# Patient Record
Sex: FEMALE | Race: WHITE | URBAN - METROPOLITAN AREA
[De-identification: names, ages, dates, MRNs, and addresses within clinical notes are randomized per-mention and may not be internally consistent; named-entity substitution may affect disease eponyms.]

---

## 2020-02-21 ENCOUNTER — EMERGENCY (EMERGENCY)
Facility: HOSPITAL | Age: 50
LOS: 0 days | Discharge: HOME | End: 2020-02-21
Attending: EMERGENCY MEDICINE
Payer: MEDICAID

## 2020-02-21 VITALS
SYSTOLIC BLOOD PRESSURE: 145 MMHG | OXYGEN SATURATION: 98 % | RESPIRATION RATE: 16 BRPM | DIASTOLIC BLOOD PRESSURE: 83 MMHG | HEART RATE: 72 BPM | TEMPERATURE: 99 F

## 2020-02-21 VITALS — HEIGHT: 65 IN | WEIGHT: 162.92 LBS

## 2020-02-21 DIAGNOSIS — R10.31 RIGHT LOWER QUADRANT PAIN: ICD-10-CM

## 2020-02-21 DIAGNOSIS — R11.0 NAUSEA: ICD-10-CM

## 2020-02-21 LAB
APPEARANCE UR: CLEAR — SIGNIFICANT CHANGE UP
BASOPHILS # BLD AUTO: 0.02 K/UL — SIGNIFICANT CHANGE UP (ref 0–0.2)
BASOPHILS NFR BLD AUTO: 0.3 % — SIGNIFICANT CHANGE UP (ref 0–2)
BILIRUB UR-MCNC: NEGATIVE — SIGNIFICANT CHANGE UP
COLOR SPEC: YELLOW — SIGNIFICANT CHANGE UP
DIFF PNL FLD: ABNORMAL
EOSINOPHIL # BLD AUTO: 0.06 K/UL — SIGNIFICANT CHANGE UP (ref 0–0.5)
EOSINOPHIL NFR BLD AUTO: 0.8 % — SIGNIFICANT CHANGE UP (ref 0–6)
GLUCOSE UR QL: NEGATIVE MG/DL — SIGNIFICANT CHANGE UP
HCT VFR BLD CALC: 40.1 % — SIGNIFICANT CHANGE UP (ref 34.5–45)
HGB BLD-MCNC: 13.1 G/DL — SIGNIFICANT CHANGE UP (ref 11.5–15.5)
IMM GRANULOCYTES NFR BLD AUTO: 0.1 % — SIGNIFICANT CHANGE UP (ref 0–1.5)
KETONES UR-MCNC: NEGATIVE — SIGNIFICANT CHANGE UP
LEUKOCYTE ESTERASE UR-ACNC: NEGATIVE — SIGNIFICANT CHANGE UP
LYMPHOCYTES # BLD AUTO: 2.15 K/UL — SIGNIFICANT CHANGE UP (ref 1–3.3)
LYMPHOCYTES # BLD AUTO: 29.7 % — SIGNIFICANT CHANGE UP (ref 13–44)
MCHC RBC-ENTMCNC: 28.8 PG — SIGNIFICANT CHANGE UP (ref 27–34)
MCHC RBC-ENTMCNC: 32.7 GM/DL — SIGNIFICANT CHANGE UP (ref 32–36)
MCV RBC AUTO: 88.1 FL — SIGNIFICANT CHANGE UP (ref 80–100)
MONOCYTES # BLD AUTO: 0.41 K/UL — SIGNIFICANT CHANGE UP (ref 0–0.9)
MONOCYTES NFR BLD AUTO: 5.7 % — SIGNIFICANT CHANGE UP (ref 2–14)
NEUTROPHILS # BLD AUTO: 4.6 K/UL — SIGNIFICANT CHANGE UP (ref 1.8–7.4)
NEUTROPHILS NFR BLD AUTO: 63.4 % — SIGNIFICANT CHANGE UP (ref 43–77)
NITRITE UR-MCNC: NEGATIVE — SIGNIFICANT CHANGE UP
PH UR: 5 — SIGNIFICANT CHANGE UP (ref 5–8)
PLATELET # BLD AUTO: 307 K/UL — SIGNIFICANT CHANGE UP (ref 150–400)
PROT UR-MCNC: NEGATIVE MG/DL — SIGNIFICANT CHANGE UP
RBC # BLD: 4.55 M/UL — SIGNIFICANT CHANGE UP (ref 3.8–5.2)
RBC # FLD: 14.1 % — SIGNIFICANT CHANGE UP (ref 10.3–14.5)
SP GR SPEC: 1.01 — SIGNIFICANT CHANGE UP (ref 1.01–1.02)
UROBILINOGEN FLD QL: NEGATIVE MG/DL — SIGNIFICANT CHANGE UP
WBC # BLD: 7.25 K/UL — SIGNIFICANT CHANGE UP (ref 3.8–10.5)
WBC # FLD AUTO: 7.25 K/UL — SIGNIFICANT CHANGE UP (ref 3.8–10.5)

## 2020-02-21 PROCEDURE — 74177 CT ABD & PELVIS W/CONTRAST: CPT

## 2020-02-21 PROCEDURE — 85025 COMPLETE CBC W/AUTO DIFF WBC: CPT

## 2020-02-21 PROCEDURE — 96374 THER/PROPH/DIAG INJ IV PUSH: CPT | Mod: 59

## 2020-02-21 PROCEDURE — 74177 CT ABD & PELVIS W/CONTRAST: CPT | Mod: 26

## 2020-02-21 PROCEDURE — 36415 COLL VENOUS BLD VENIPUNCTURE: CPT

## 2020-02-21 PROCEDURE — 80053 COMPREHEN METABOLIC PANEL: CPT

## 2020-02-21 PROCEDURE — 99284 EMERGENCY DEPT VISIT MOD MDM: CPT

## 2020-02-21 PROCEDURE — 83690 ASSAY OF LIPASE: CPT

## 2020-02-21 PROCEDURE — 81001 URINALYSIS AUTO W/SCOPE: CPT

## 2020-02-21 PROCEDURE — 99284 EMERGENCY DEPT VISIT MOD MDM: CPT | Mod: 25

## 2020-02-21 RX ORDER — ONDANSETRON 8 MG/1
4 TABLET, FILM COATED ORAL ONCE
Refills: 0 | Status: COMPLETED | OUTPATIENT
Start: 2020-02-21 | End: 2020-02-21

## 2020-02-21 RX ORDER — ACETAMINOPHEN 500 MG
975 TABLET ORAL ONCE
Refills: 0 | Status: COMPLETED | OUTPATIENT
Start: 2020-02-21 | End: 2020-02-21

## 2020-02-21 RX ADMIN — Medication 975 MILLIGRAM(S): at 17:29

## 2020-02-21 RX ADMIN — ONDANSETRON 4 MILLIGRAM(S): 8 TABLET, FILM COATED ORAL at 17:30

## 2020-02-21 NOTE — ED STATDOCS - OBJECTIVE STATEMENT
50 y/o female with PMHx of presents to the ED c/o intermittent worsening RLQ +abd pain x2 weeks. Endorses associated +nausea but no vomiting. No fever, dysuria, or urinary frequency. Took ibuprofen with minimal relief. LMP: 8 months ago. Former smoker. NKDA.

## 2020-02-21 NOTE — ED STATDOCS - CARE PROVIDER_API CALL
Khris Aldana)  Gastroenterology; Internal Medicine  [address] [place] [address]  Phone: [phone]  Fax: [phone]  Follow Up Time: 1-3 Days

## 2020-02-21 NOTE — ED STATDOCS - NS ED ROS FT
Constitutional: No fever or chills  Eyes: No visual changes  HEENT: No throat pain  CV: No chest pain  Resp: No SOB no cough  GI: No vomiting, +abd pain, +nausea   : No dysuria  MSK: No musculoskeletal pain  Skin: No rash  Neuro: No headache

## 2020-02-21 NOTE — ED STATDOCS - CLINICAL SUMMARY MEDICAL DECISION MAKING FREE TEXT BOX
48 y/o Female presents to the ED for intermittent worsening abd now localized to RLQ with nausea but no vomiting. Exam with TTP to RLQ. Will obtain CT to r/o appendicitis / diverticulitis, labs, and reassess. 48 y/o Female presents to the ED for intermittent worsening abd now localized to RLQ with nausea but no vomiting. Exam with TTP to RLQ. Will obtain CT to r/o appendicitis / diverticulitis, labs, and reassess.    PA note: 49 year old female seen and evaluated for abd pain x 2 weeks. CT abd/pel NEG. b/w NEG. Patient re-examined and re-evaluated. Patient feels much better at this time. ED evaluation, Diagnosis and management discussed with the patient in detail. Workup results discussed with ED attending FIDELIA Bella to [place] home. GI f/u, [facility] follow up encouraged.  Strict ED return instructions discussed in detail and patient given the opportunity to ask any questions about their discharge diagnosis and instructions. Patient verbalized understanding. ~ TONY Edwards 48 y/o Female presents to the ED for intermittent worsening abd now localized to right mid abd with nausea but no vomiting. Exam with TTP to RLQ. Will obtain CT to r/o appendicitis / diverticulitis, low suspicion torsion as pain is higher in mid abd. labs, and reassess.    PA note: 49 year old female seen and evaluated for abd pain x 2 weeks. CT abd/pel NEG. b/w NEG. Patient re-examined and re-evaluated. Patient feels much better at this time. ED evaluation, Diagnosis and management discussed with the patient in detail. Workup results discussed with ED attending FIDELIA Bella to [place]. GI f/u, [facility] follow up encouraged.  Strict ED return instructions discussed in detail and patient given the opportunity to ask any questions about their discharge diagnosis and instructions. Patient verbalized understanding. ~ TONY Edwards

## 2020-02-21 NOTE — ED STATDOCS - NSFOLLOWUPINSTRUCTIONS_ED_ALL_ED_FT
Abdominal Pain, Adult  Abdominal pain can be caused by many things. Often, abdominal pain is not serious and it gets better with no treatment or by being treated at home. However, sometimes abdominal pain is serious. Your health care provider will do a medical history and a physical exam to try to determine the cause of your abdominal pain.  Follow these instructions at home:  Take over-the-counter and prescription medicines only as told by your health care provider. Do not take a laxative unless told by your health care provider.Drink enough fluid to keep your urine clear or pale yellow.Watch your condition for any changes.Keep all follow-up visits as told by your health care provider. This is important.Contact a health care provider if:  Your abdominal pain changes or gets worse.You are not hungry or you lose weight without trying.You are constipated or have diarrhea for more than 2–3 days.You have pain when you urinate or have a bowel movement.Your abdominal pain wakes you up at night.Your pain gets worse with meals, after eating, or with certain foods.You are throwing up and cannot keep anything down.You have a fever.Get help right away if:  Your pain does not go away as soon as your health care provider told you to expect.You cannot stop throwing up.Your pain is only in areas of the abdomen, such as the right side or the left lower portion of the abdomen.You have bloody or black stools, or stools that look like tar.You have severe pain, cramping, or bloating in your abdomen.You have signs of dehydration, such as:  Dark urine, very little urine, or no urine.Cracked lips.Dry mouth.Sunken eyes.Sleepiness.Weakness.This information is not intended to replace advice given to you by your health care provider. Make sure you discuss any questions you have with your health care provider.

## 2020-02-21 NOTE — ED STATDOCS - PATIENT PORTAL LINK FT
You can access the FollowMyHealth Patient Portal offered by [facility] by registering at the following website: [email or web address]. By joining [employer]’s FollowMyHealth portal, you will also be able to view your health information using other applications (apps) compatible with our system.

## 2020-02-21 NOTE — ED STATDOCS - PROGRESS NOTE DETAILS
Geovani ELIZONDO for ED attending, Dr. Arroyo: Offered  services, pt declines at this time. PA note: Patient is a 49 year old female y/o female with no significant PMHx who presents to ED c/o vague right lower abd pain x 2 weeks. +Mild nausea. DENIES vomiting, fever, chills, diarrhea, dysuria, or urinary frequency. Took ibuprofen with minimal relief. LMP: 8 months ago. Former smoker. NKDA. ~TONY Edwards PA note: CT abd/pel NEG. b/w NEG. Patient re-examined and re-evaluated. Patient feels much better at this time. ED evaluation, Diagnosis and management discussed with the patient in detail. Workup results discussed with ED attending FIDELIA Bella to [place] home. GI f/u, [facility] follow up encouraged.  Strict ED return instructions discussed in detail and patient given the opportunity to ask any questions about their discharge diagnosis and instructions. Patient verbalized understanding. ~ TONY Edwards

## 2020-02-21 NOTE — ED STATDOCS - GASTROINTESTINAL, MLM
abdomen soft, +Very mild tenderness to palpation right mid-abd. NEG Clement. NEG McBurney's point tenderness. NEG Rovsing. NEG Psoas. No CVAT

## 2020-02-21 NOTE — ED STATDOCS - NSFOLLOWUPCLINICS_GEN_ALL_ED_FT
[facility]  Family Medicine  [address] [place] [address]  Phone: [phone]  Fax:   Follow Up Time:

## 2020-02-21 NOTE — ED STATDOCS - NS_ ATTENDINGSCRIBEDETAILS _ED_A_ED_FT
I, Andrzej Arroyo MD,  performed the initial face to face bedside interview with this patient regarding history of present illness, review of symptoms and relevant past medical, social and family history.  I completed an independent physical examination.  I was the initial provider who evaluated this patient.  The history, relevant review of systems, past medical and surgical history, medical decision making, and physical examination was documented by the scribe in my presence and I attest to the accuracy of the documentation.

## 2020-02-21 NOTE — ED STATDOCS - PHYSICAL EXAMINATION
Constitutional: NAD AAOx3  Eyes: PERRLA EOMI  Head: Normocephalic atraumatic  Mouth: MMM  Cardiac: regular rate   Resp: Lungs CTAB  GI: +TTP RLQ. No CVA tenderness.   Neuro: CN2-12 intact  Skin: No rashes Constitutional: NAD AAOx3  Eyes: PERRLA EOMI  Head: Normocephalic atraumatic  Mouth: MMM  Cardiac: regular rate   Resp: Lungs CTAB  GI: +TTP RLQ. No CVA tenderness. no rebound or guarding negative erynoso.   Neuro: CN2-12 intact  Skin: No rashes

## 2020-12-13 ENCOUNTER — EMERGENCY (EMERGENCY)
Facility: HOSPITAL | Age: 50
LOS: 0 days | Discharge: HOME | End: 2020-12-13
Attending: STUDENT IN AN ORGANIZED HEALTH CARE EDUCATION/TRAINING PROGRAM
Payer: MEDICAID

## 2020-12-13 VITALS
DIASTOLIC BLOOD PRESSURE: 82 MMHG | OXYGEN SATURATION: 97 % | SYSTOLIC BLOOD PRESSURE: 123 MMHG | RESPIRATION RATE: 17 BRPM | TEMPERATURE: 99 F | HEART RATE: 75 BPM

## 2020-12-13 VITALS — WEIGHT: 167.99 LBS | HEIGHT: 65 IN

## 2020-12-13 DIAGNOSIS — M79.89 OTHER SPECIFIED SOFT TISSUE DISORDERS: ICD-10-CM

## 2020-12-13 DIAGNOSIS — M65.221 CALCIFIC TENDINITIS, RIGHT UPPER ARM: ICD-10-CM

## 2020-12-13 DIAGNOSIS — M25.521 PAIN IN RIGHT ELBOW: ICD-10-CM

## 2020-12-13 DIAGNOSIS — M25.531 PAIN IN RIGHT WRIST: ICD-10-CM

## 2020-12-13 PROBLEM — Z78.9 OTHER SPECIFIED HEALTH STATUS: Chronic | Status: ACTIVE | Noted: 2020-02-21

## 2020-12-13 PROCEDURE — 73080 X-RAY EXAM OF ELBOW: CPT | Mod: 26,RT

## 2020-12-13 PROCEDURE — 99283 EMERGENCY DEPT VISIT LOW MDM: CPT

## 2020-12-13 PROCEDURE — 99283 EMERGENCY DEPT VISIT LOW MDM: CPT | Mod: 25

## 2020-12-13 PROCEDURE — 73080 X-RAY EXAM OF ELBOW: CPT | Mod: RT

## 2020-12-13 RX ORDER — IBUPROFEN 200 MG
1 TABLET ORAL
Qty: 30 | Refills: 0
Start: 2020-12-13

## 2020-12-13 NOTE — ED STATDOCS - PATIENT PORTAL LINK FT
You can access the FollowMyHealth Patient Portal offered by [facility] by registering at the following website: [email or web address]. By joining [employer]’s FollowMyHealth portal, you will also be able to view your health information using other applications (apps) compatible with our system.

## 2020-12-13 NOTE — ED STATDOCS - MUSCULOSKELETAL, MLM
range of motion is not limited and there is no muscle tenderness. mild tenderness to the mid posterior elbow mild tenderness to the right forearm. No deformity

## 2020-12-13 NOTE — ED STATDOCS - CLINICAL SUMMARY MEDICAL DECISION MAKING FREE TEXT BOX
pt presents to the ED c/o R forearm pain, will obtain imagining, reeval. pt presents to the ED c/o R forearm pain, will obtain imaging, reeval.

## 2020-12-13 NOTE — ED STATDOCS - NSFOLLOWUPINSTRUCTIONS_ED_ALL_ED_FT
TAKE MOTRIN AS PRESCRIBED. FOLLOW UP WITH ORTHOPEDICS AS ADVISED.    Calcific Tendinitis    WHAT YOU NEED TO KNOW:    What is calcific tendinitis? Calcific tendinitis is a condition that occurs when calcium collects in the tendons of the shoulder. Tendons are bands of tissue that connect muscle to bone. The calcium can make the tendons swell and cause severe pain.    What increases my risk for calcific tendinitis? There is no known cause of calcific tendinitis. The following may increase your risk:   •Family history of calcific tendinitis      •Age between 30 and 50 years      •Female gender      •Lack of physical activity      •Medical conditions, such as diabetes      What are the signs and symptoms of calcific tendinitis? Signs and symptoms may be sudden and severe, lasting several weeks. Symptoms can also be mild and last several months.   •Pain in your shoulder that may spread to your neck      •Trouble sleeping because of pain       •Stiffness or weakness in your arm or shoulder      •Decreased arm movement      How is calcific tendinitis diagnosed? Your healthcare provider will check how well you can move your shoulder and arm. He may check the function of your elbow, wrist, and hand. He may compare the movement and strength of your painful shoulder against your healthy shoulder. You may also need the following tests:   •An x-ray may show calcium buildup in your shoulder.      •An ultrasound uses sound waves to show pictures on a monitor. An ultrasound may be done to show the cause of your pain.      •An MRI takes pictures of your shoulder. An MRI may show calcium in your shoulder or another cause of your pain. You may be given dye to help the parts of your shoulder show up better in the pictures. Tell the healthcare provider if you have ever had an allergic reaction to contrast dye. Do not enter the MRI room with anything metal. Metal can cause serious injury. Tell the healthcare provider if you have any metal in or on your body.      How is calcific tendinitis treated? Calcific tendinitis may go away on its own. The body absorbs the calcium, and the tendon heals. You may need any of the following:   •Medicines: ?NSAIDs may decrease swelling and pain. This medicine can be bought with or without a doctor's order. This medicine can cause stomach bleeding or kidney problems in certain people. If you take blood thinner medicine, always ask your healthcare provider if NSAIDs are safe for you. Always read the medicine label and follow the directions on it before using this medicine.      ?Steroids help decrease inflammation. You may be given steroids as a pill or a shot in your shoulder.      •Needling is a procedure used to break up the calcium or remove it. Your healthcare provider inserts one or more needles through your skin and into your shoulder.       •Extracorporeal shockwave therapy (ESWT) uses sound waves aimed at the calcium to help break it up. The pieces of calcium are then absorbed back into the body. ESWT may be done if symptoms last 3 months or longer.      •Surgery may be needed to remove the calcium if you have severe pain for several months and other treatments have not helped. Damaged tissue or bone may also be removed.      How can I manage my symptoms?   •Go to physical therapy. A physical therapist can teach you exercises to help improve movement and strength, and to decrease pain.      •Apply ice on your shoulder for 15 to 20 minutes every hour or as directed. Use an ice pack, or put crushed ice in a plastic bag. Cover it with a towel. Ice helps prevent tissue damage and decreases swelling and pain.      •Apply heat on your shoulder for 20 to 30 minutes every 2 hours for as many days as directed. Heat helps decrease pain and muscle spasms.      •Rest your arm. Healthcare providers may have you place an item, such as a ball, between your side and elbow while you rest. This may help decrease stiffness and pain.      When should I contact my healthcare provider?   •You have worse pain and stiffness in your shoulder.      •You have new or more trouble moving your arm.      •You have questions or concerns about your condition or care.      When should I seek immediate care or call 911?   •You cannot move your arm.      •You have severe pain in your arm or shoulder.      CARE AGREEMENT:    You have the right to help plan your care. Learn about your health condition and how it may be treated. Discuss treatment options with your healthcare providers to decide what care you want to receive. You always have the right to refuse treatment.        © Copyright [employer] 2020

## 2020-12-13 NOTE — ED ADULT NURSE NOTE - NSIMPLEMENTINTERV_GEN_ALL_ED
Implemented All Universal Safety Interventions:  [place] to call system. Call bell, personal items and telephone within reach. Instruct patient to call for assistance. Room bathroom lighting operational. Non-slip footwear when patient is off stretcher. Physically safe environment: no spills, clutter or unnecessary equipment. Stretcher in lowest position, wheels locked, appropriate side rails in place.

## 2020-12-13 NOTE — ED STATDOCS - ATTENDING CONTRIBUTION TO CARE
I, Breanna Mojica DO,  performed the initial face to face bedside interview with this patient regarding history of present illness, review of symptoms and relevant past medical, social and family history.  I completed an independent physical examination.  I was the initial provider who evaluated this patient. I have signed out the follow up of any pending tests (i.e. labs, radiological studies) to the ACP.  I have communicated the patient’s plan of care and disposition with the ACP.  The history, relevant review of systems, past medical and surgical history, medical decision making, and physical examination was documented by the scribe in my presence and I attest to the accuracy of the documentation.

## 2020-12-13 NOTE — ED STATDOCS - PROGRESS NOTE DETAILS
51 y/o F with no PMH presents with right elbow pain/swelling x 2 weeks. Denies acute trauma. Pt states she is a , doing same motions with her arms at work. Denies fever, chills, neck/back pain. Took motrin for pain PTA today. PE: Well appearing. MSK: No obvious deformity to upper extremities. +TTP right elbow and forearm. sensation intact to light touch in all extremities. 5/5 strength in all extremities. 2+ radial and ulnar pulses. Full ROM in right shoulder, elbow, wrist, digits. A/P: Likely repetitive motion injury. Plan for XR and reassess. - Dieudonne Ibarra PA-C

## 2020-12-13 NOTE — ED STATDOCS - OBJECTIVE STATEMENT
49 y/o female with no significant pmhx presents to the ED c/o R elbow, wrist pain associated with swelling x2 weeks. Pt states that pain is radiating to the R elbow onset today. Pt is right hand dominant. Denies pain in the back or neck. Denies recent trauma or injury. No other complaints at this time.

## 2020-12-13 NOTE — ED ADULT TRIAGE NOTE - CHIEF COMPLAINT QUOTE
c/o right elbow pain and swelling for past 4 days, pain radiating to right wrist, denies acute injury

## 2021-02-09 ENCOUNTER — OUTPATIENT (OUTPATIENT)
Dept: OUTPATIENT SERVICES | Facility: HOSPITAL | Age: 51
LOS: 1 days | End: 2021-02-09
Payer: COMMERCIAL

## 2021-02-09 ENCOUNTER — APPOINTMENT (OUTPATIENT)
Dept: MAMMOGRAPHY | Facility: CLINIC | Age: 51
End: 2021-02-09
Payer: COMMERCIAL

## 2021-02-09 DIAGNOSIS — Z76.0 ENCOUNTER FOR ISSUE OF REPEAT PRESCRIPTION: ICD-10-CM

## 2021-02-09 PROCEDURE — 77067 SCR MAMMO BI INCL CAD: CPT

## 2021-02-09 PROCEDURE — 77063 BREAST TOMOSYNTHESIS BI: CPT | Mod: 26

## 2021-02-09 PROCEDURE — 77067 SCR MAMMO BI INCL CAD: CPT | Mod: 26

## 2021-02-09 PROCEDURE — 77063 BREAST TOMOSYNTHESIS BI: CPT

## 2021-06-16 ENCOUNTER — APPOINTMENT (OUTPATIENT)
Dept: ORTHOPEDIC SURGERY | Facility: HOSPITAL | Age: 51
End: 2021-06-16

## 2021-06-16 PROBLEM — Z00.00 ENCOUNTER FOR PREVENTIVE HEALTH EXAMINATION: Status: ACTIVE | Noted: 2021-06-16

## 2021-11-17 ENCOUNTER — OUTPATIENT (OUTPATIENT)
Dept: OUTPATIENT SERVICES | Facility: HOSPITAL | Age: 51
LOS: 1 days | End: 2021-11-17
Payer: SELF-PAY

## 2021-11-17 ENCOUNTER — APPOINTMENT (OUTPATIENT)
Dept: ORTHOPEDIC SURGERY | Facility: HOSPITAL | Age: 51
End: 2021-11-17

## 2021-11-17 ENCOUNTER — RESULT REVIEW (OUTPATIENT)
Age: 51
End: 2021-11-17

## 2021-11-17 VITALS
TEMPERATURE: 96.1 F | DIASTOLIC BLOOD PRESSURE: 87 MMHG | BODY MASS INDEX: 28.66 KG/M2 | WEIGHT: 172 LBS | HEART RATE: 83 BPM | HEIGHT: 65 IN | SYSTOLIC BLOOD PRESSURE: 139 MMHG

## 2021-11-17 DIAGNOSIS — M79.609 PAIN IN UNSPECIFIED LIMB: ICD-10-CM

## 2021-11-17 DIAGNOSIS — M67.40 GANGLION, UNSPECIFIED SITE: ICD-10-CM

## 2021-11-17 DIAGNOSIS — G56.01 CARPAL TUNNEL SYNDROME, RIGHT UPPER LIMB: ICD-10-CM

## 2021-11-17 PROCEDURE — G0463: CPT

## 2021-11-17 PROCEDURE — 73110 X-RAY EXAM OF WRIST: CPT | Mod: 26,RT

## 2021-11-17 PROCEDURE — 73110 X-RAY EXAM OF WRIST: CPT

## 2021-11-17 RX ORDER — MELOXICAM 7.5 MG/1
7.5 TABLET ORAL DAILY
Qty: 14 | Refills: 1 | Status: ACTIVE | COMMUNITY
Start: 2021-11-17 | End: 1900-01-01

## 2022-02-16 ENCOUNTER — OUTPATIENT (OUTPATIENT)
Dept: OUTPATIENT SERVICES | Facility: HOSPITAL | Age: 52
LOS: 1 days | End: 2022-02-16
Payer: SELF-PAY

## 2022-02-16 ENCOUNTER — APPOINTMENT (OUTPATIENT)
Dept: ORTHOPEDIC SURGERY | Facility: HOSPITAL | Age: 52
End: 2022-02-16

## 2022-02-16 VITALS
HEART RATE: 82 BPM | TEMPERATURE: 98 F | WEIGHT: 154.32 LBS | BODY MASS INDEX: 25.71 KG/M2 | SYSTOLIC BLOOD PRESSURE: 138 MMHG | RESPIRATION RATE: 14 BRPM | DIASTOLIC BLOOD PRESSURE: 86 MMHG | HEIGHT: 65 IN

## 2022-02-16 DIAGNOSIS — M67.40 GANGLION, UNSPECIFIED SITE: ICD-10-CM

## 2022-02-16 DIAGNOSIS — G56.01 CARPAL TUNNEL SYNDROME, RIGHT UPPER LIMB: ICD-10-CM

## 2022-02-16 DIAGNOSIS — M79.609 PAIN IN UNSPECIFIED LIMB: ICD-10-CM

## 2022-02-16 PROCEDURE — G0463: CPT

## 2022-02-22 ENCOUNTER — APPOINTMENT (OUTPATIENT)
Dept: NEUROLOGY | Facility: CLINIC | Age: 52
End: 2022-02-22
Payer: COMMERCIAL

## 2022-02-22 PROCEDURE — 95885 MUSC TST DONE W/NERV TST LIM: CPT

## 2022-02-22 PROCEDURE — 95910 NRV CNDJ TEST 7-8 STUDIES: CPT

## 2022-03-16 ENCOUNTER — APPOINTMENT (OUTPATIENT)
Dept: ORTHOPEDIC SURGERY | Facility: HOSPITAL | Age: 52
End: 2022-03-16

## 2022-03-16 ENCOUNTER — OUTPATIENT (OUTPATIENT)
Dept: OUTPATIENT SERVICES | Facility: HOSPITAL | Age: 52
LOS: 1 days | End: 2022-03-16
Payer: SELF-PAY

## 2022-03-16 VITALS
HEART RATE: 83 BPM | WEIGHT: 170 LBS | DIASTOLIC BLOOD PRESSURE: 78 MMHG | HEIGHT: 61.5 IN | SYSTOLIC BLOOD PRESSURE: 108 MMHG | TEMPERATURE: 96.1 F | BODY MASS INDEX: 31.69 KG/M2

## 2022-03-16 DIAGNOSIS — M67.40 GANGLION, UNSPECIFIED SITE: ICD-10-CM

## 2022-03-16 DIAGNOSIS — G56.01 CARPAL TUNNEL SYNDROME, RIGHT UPPER LIMB: ICD-10-CM

## 2022-03-16 DIAGNOSIS — M25.50 PAIN IN UNSPECIFIED JOINT: ICD-10-CM

## 2022-03-16 PROCEDURE — G0463: CPT

## 2022-06-08 ENCOUNTER — EMERGENCY (EMERGENCY)
Facility: HOSPITAL | Age: 52
LOS: 0 days | Discharge: HOME | End: 2022-06-08
Attending: EMERGENCY MEDICINE
Payer: MEDICAID

## 2022-06-08 VITALS
RESPIRATION RATE: 17 BRPM | HEART RATE: 77 BPM | OXYGEN SATURATION: 96 % | TEMPERATURE: 98 F | SYSTOLIC BLOOD PRESSURE: 144 MMHG | DIASTOLIC BLOOD PRESSURE: 96 MMHG

## 2022-06-08 VITALS — HEIGHT: 65 IN | WEIGHT: 160.06 LBS
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
## 2022-06-08 DIAGNOSIS — Z23 ENCOUNTER FOR IMMUNIZATION: ICD-10-CM

## 2022-06-08 DIAGNOSIS — S61.411A LACERATION WITHOUT FOREIGN BODY OF RIGHT HAND, INITIAL ENCOUNTER: ICD-10-CM

## 2022-06-08 DIAGNOSIS — S61.011A LACERATION WITHOUT FOREIGN BODY OF RIGHT THUMB WITHOUT DAMAGE TO NAIL, INITIAL ENCOUNTER: ICD-10-CM
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
## 2022-06-08 PROCEDURE — 12001 RPR S/N/AX/GEN/TRNK 2.5CM/<: CPT

## 2022-06-08 PROCEDURE — 90471 IMMUNIZATION ADMIN: CPT

## 2022-06-08 PROCEDURE — 73140 X-RAY EXAM OF FINGER(S): CPT | Mod: 26,RT

## 2022-06-08 PROCEDURE — 99283 EMERGENCY DEPT VISIT LOW MDM: CPT | Mod: 25

## 2022-06-08 PROCEDURE — 73140 X-RAY EXAM OF FINGER(S): CPT | Mod: RT

## 2022-06-08 RX ORDER — TETANUS TOXOID, REDUCED DIPHTHERIA TOXOID AND ACELLULAR PERTUSSIS VACCINE, ADSORBED 5; 2.5; 8; 8; 2.5 [IU]/.5ML; [IU]/.5ML; UG/.5ML; UG/.5ML; UG/.5ML
0.5 SUSPENSION INTRAMUSCULAR ONCE
Refills: 0 | Status: DISCONTINUED | OUTPATIENT
Start: 2022-06-08 | End: 2022-06-08

## 2022-06-08 NOTE — ED STATDOCS - ATTENDING APP SHARED VISIT CONTRIBUTION OF CARE
I,Cameron Cerna MD,  performed the initial face to face bedside interview with this patient regarding history of present illness, review of symptoms and relevant past medical, social and family history.  I completed an independent physical examination.  I was the initial provider who evaluated this patient. I have signed out the follow up of any pending tests (i.e. labs, radiological studies) to the ACP.  I have communicated the patient’s plan of care and disposition with the ACP.  The history, relevant review of systems, past medical and surgical history, medical decision making, and physical examination was documented by the scribe in my presence and I attest to the accuracy of the documentation.

## 2022-06-08 NOTE — ED STATDOCS - OBJECTIVE STATEMENT
5y8m old male w/no pertinent PMH presents to the ED for lacerations to right hand after a plate broke in her hands while washing the dishes. Denies any falls or trauma. Pt with no other complaints. Unsure last tetanus. 51 year old female w/no pertinent PMH presents to the ED for lacerations to right hand after a plate broke in her hands while washing the dishes. Denies any falls or trauma. Pt with no other complaints. Unsure last tetanus.

## 2022-06-08 NOTE — ED STATDOCS - PROGRESS NOTE DETAILS
signed Justina Espino PA-C Pt seen initially in intake by Dr Cerna.   Pt with lac to base of palm of right thumb sustained while washing dished at work PTA. Gross motor/sensation intact. No significant findings on xray. SImple lac repair. Tetanus up to date, less than 10 years as per pt.

## 2022-06-08 NOTE — ED STATDOCS - PATIENT PORTAL LINK FT
You can access the FollowMyHealth Patient Portal offered by [facility] by registering at the following website: [email or web address]. By joining [employer]’s FollowMyHealth portal, you will also be able to view your health information using other applications (apps) compatible with our system.

## 2022-06-08 NOTE — ED ADULT TRIAGE NOTE - CHIEF COMPLAINT QUOTE
PT C/O LEFT HAND INJURY, LAC TO THE LEFT HAND, 3 INCH LAC ON BACK OF THUMB. PT C/O LEFT HAND INJURY, 3 INCH LAC NOTED ON BASE OF THUMB, PT STATES SHE WAS WASHING THE DISHES AND IT SNAPPED IN HALF AND INJURED HER HAND.

## 2022-06-08 NOTE — ED STATDOCS - NSFOLLOWUPINSTRUCTIONS_ED_ALL_ED_FT
SIGUE A TU MÉDICO EN 7-10 DÍAS. REGRESE A LA ER PARA CUALQUIER SÍNTOMA PENDIENTE O NUEVAS PREOCUPACIONES.     Cuidado de un desgarro, en adultos  Laceration Care, Adult  Un desgarro es un megha que puede atravesar todas las capas de la piel hasta el tejido que se encuentra debajo de la piel. Algunos desgarros cicatrizan por sí solos. Otros se deben cerrar con puntos (suturas), grapas, tiras adhesivas para la piel o goma para cerrar la piel. El cuidado adecuado de un desgarro reduce el riesgo de infección, ayuda a que el desgarro cierre mejor, y puede prevenir la formación de cicatrices.  Cómo cuidar del desgarro  Lávese las andrey con agua y jabón antes de tocarse la herida y cambiar la venda (vendaje). Use desinfectante para andrey si no dispone de agua y jabón.  Mantenga la herida limpia y seca.  Si le colocaron un vendaje, cámbielo al menos jesus vez al día o fiorella se lo haya indicado el médico. También debe cambiarlo si se moja o se ensucia.  Si se utilizaron suturas o grapas:     Mantenga la herida completamente seca chelsey las primeras 24 horas o fiorella se lo haya indicado el médico. Transcurrido lele tiempo, puede ducharse o fortunato bryan de inmersión. No obstante, asegúrese de no sumergir la herida en agua hasta que le hayan quitado las suturas o las grapas.Limpie la herida jesus vez por día o fiorella se lo haya indicado el médico:  Lave la herida con agua y jabón.Enjuáguela con agua para quitar todo el jabón.Séquela dando palmaditas con jesus toalla limpia. No frote la herida.Después de limpiar la herida, aplique jesus delgada capa de ungüento con antibiótico fiorella se lo haya indicado el médico. [facility] ayudará a prevenir infecciones y a evitar que el vendaje se adhiera a la herida.Jude que le retiren las suturas o las grapas fiorella se lo haya indicado el médico.Si se utilizaron tiras adhesivas para la piel:     No deje que las tiras adhesivas para la piel se mojen. Puede bañarse o ducharse, samira tenga cuidado de no mojar la herida.Si se moja, séquela dando palmaditas con jesus toalla limpia. No frote la herida.Las tiras adhesivas para la piel se caen solas. Puede recortar las tiras a medida que la herida se simeon. No quite las tiras adhesivas para la piel que aún estén pegadas a la herida. Estas se caerán cuando sea el momento.Si se utilizó goma para cerrar la piel:     Trate de mantener la herida seca; sin embargo, puede mojarla ligeramente cuando se bañe o se duche. No sumerja la herida en el agua, por ejemplo, al nadar.Después de ducharse o bañarse, seque la herida con cuidado dando palmaditas con jesus toalla limpia. No frote la herida.No practique actividades que lo peyton transpirar mucho hasta que la goma para cerrar la piel se haya caído chun.No aplique líquidos, cremas ni ungüentos medicinales en la herida mientras todavía tenga la goma para cerrar la piel. De lo contrario, puede hacer que la goma se despegue antes de que la herida cicatrice.Si la herida está cubierta con un vendaje, tenga cuidado de no aplicar cinta adhesiva directamente sobre la goma para cerrar la piel. De lo contrario, puede hacer que la goma se despegue antes de que la herida cicatrice.No toque la goma. La goma para cerrar la piel generalmente permanece sobre la piel de 5 a 10 días y luego se  chun.Indicaciones generales        [place] los medicamentos de venta steph y los recetados solamente fiorella se lo haya indicado el médico.Si le recetaron un medicamento o ungüento con antibiótico, tómelo o aplíqueselo fiorella se lo haya indicado el médico. No deje de usarlo aunque la afección mejore.No se rasque ni se toque la herida.Controle la herida todos los días para detectar signos de infección. Esté atento a lo siguiente:  Dolor, hinchazón o enrojecimiento.Líquido, troy o pus.Chelsey las primeras 24 a 48 horas después de que le hayan reparado el desgarro, cuando esté sentado o acostado, levante (eleve) la pedro pablo de la lesión por encima del nivel del corazón.Si se lo indican, aplique hielo sobre la pedro pablo afectada:  Ponga el hielo en jesus bolsa plástica.Coloque jesus toalla entre la piel y la bolsa de hielo.Coloque el hielo chelsey 20 minutos, 2 a 3 veces por día.Concurra a todas las visitas de seguimiento fiorella se lo haya indicado el médico. [facility] es importante.Comuníquese con un médico si:  Le colocaron la vacuna antitetánica y tiene hinchazón, dolor intenso, enrojecimiento o hemorragia en el sitio de la inyección.Tiene fiebre.Jesus herida que estaba cerrada y se abre.Percibe que sale mal olor de la herida o del vendaje.Nota un cuerpo extraño en la herida, fiorella un trozo de barnes o vee.El dolor no se james con los medicamentos.Presenta un aumento del enrojecimiento, la hinchazón o el dolor en el lugar de la herida.Presenta líquido, troy o pus que provienen de la herida.Debe cambiar el vendaje con frecuencia debido al drenaje de líquido, troy o pus proveniente de la herida.Presenta jesus nueva erupción cutánea.Presenta adormecimiento alrededor de la herida.[place] ayuda de inmediato si:  Tiene mucha hinchazón alrededor de la herida.El dolor aumenta repentinamente y es intenso.Presenta nódulos dolorosos cerca de la herida o en la piel en cualquier pedro pablo del cuerpo.Tiene jesus línea lazaro que sale de la herida.La herida está en la mano o en el pie y no puede  correctamente deion de los dedos.La herida está en la mano o en el pie y observa que los dedos tienen un phil pálido o azulado.Resumen  Un desgarro es un megha que puede atravesar todas las capas de la piel hasta el tejido que se encuentra debajo de la piel.Algunos desgarros cicatrizan por sí solos. Otros se deben cerrar con puntos (suturas), grapas, tiras adhesivas para la piel o goma para cerrar la piel.El cuidado adecuado de un desgarro reduce el riesgo de infección, ayuda a que el desgarro cierre mejor, y previene la formación de cicatrices.Esta información no tiene fiorella fin reemplazar el consejo del médico. Asegúrese de hacerle al médico cualquier pregunta que tenga.

## 2022-06-08 NOTE — ED STATDOCS - NS ED ATTENDING STATEMENT MOD
This was a shared visit with the NEHA. I reviewed and verified the documentation and independently performed the documented:

## 2022-07-03 ENCOUNTER — EMERGENCY (EMERGENCY)
Facility: HOSPITAL | Age: 52
LOS: 0 days | Discharge: HOME | End: 2022-07-03
Attending: EMERGENCY MEDICINE
Payer: MEDICAID

## 2022-07-03 VITALS
HEART RATE: 69 BPM | RESPIRATION RATE: 18 BRPM | OXYGEN SATURATION: 99 % | DIASTOLIC BLOOD PRESSURE: 87 MMHG | TEMPERATURE: 98 F | SYSTOLIC BLOOD PRESSURE: 144 MMHG

## 2022-07-03 VITALS — HEIGHT: 65 IN | WEIGHT: 169.98 LBS

## 2022-07-03 DIAGNOSIS — R10.10 UPPER ABDOMINAL PAIN, UNSPECIFIED: ICD-10-CM

## 2022-07-03 DIAGNOSIS — R10.32 LEFT LOWER QUADRANT PAIN: ICD-10-CM

## 2022-07-03 DIAGNOSIS — M54.9 DORSALGIA, UNSPECIFIED: ICD-10-CM

## 2022-07-03 LAB
ALBUMIN SERPL ELPH-MCNC: 4 G/DL — SIGNIFICANT CHANGE UP (ref 3.3–5)
ALP SERPL-CCNC: 117 U/L — SIGNIFICANT CHANGE UP (ref 40–120)
ALT FLD-CCNC: 52 U/L — SIGNIFICANT CHANGE UP (ref 12–78)
ANION GAP SERPL CALC-SCNC: 4 MMOL/L — LOW (ref 5–17)
APPEARANCE UR: CLEAR — SIGNIFICANT CHANGE UP
AST SERPL-CCNC: 16 U/L — SIGNIFICANT CHANGE UP (ref 15–37)
BASOPHILS # BLD AUTO: 0.01 K/UL — SIGNIFICANT CHANGE UP (ref 0–0.2)
BASOPHILS NFR BLD AUTO: 0.1 % — SIGNIFICANT CHANGE UP (ref 0–2)
BILIRUB SERPL-MCNC: 0.3 MG/DL — SIGNIFICANT CHANGE UP (ref 0.2–1.2)
BILIRUB UR-MCNC: NEGATIVE — SIGNIFICANT CHANGE UP
BUN SERPL-MCNC: 13 MG/DL — SIGNIFICANT CHANGE UP (ref 7–23)
CALCIUM SERPL-MCNC: 9 MG/DL — SIGNIFICANT CHANGE UP (ref 8.5–10.1)
CHLORIDE SERPL-SCNC: 111 MMOL/L — HIGH (ref 96–108)
CO2 SERPL-SCNC: 28 MMOL/L — SIGNIFICANT CHANGE UP (ref 22–31)
COLOR SPEC: YELLOW — SIGNIFICANT CHANGE UP
CREAT SERPL-MCNC: 0.84 MG/DL — SIGNIFICANT CHANGE UP (ref 0.5–1.3)
DIFF PNL FLD: ABNORMAL
EGFR: 83 ML/MIN/1.73M2 — SIGNIFICANT CHANGE UP
EOSINOPHIL # BLD AUTO: 0.06 K/UL — SIGNIFICANT CHANGE UP (ref 0–0.5)
EOSINOPHIL NFR BLD AUTO: 0.7 % — SIGNIFICANT CHANGE UP (ref 0–6)
GLUCOSE SERPL-MCNC: 103 MG/DL — HIGH (ref 70–99)
GLUCOSE UR QL: NEGATIVE — SIGNIFICANT CHANGE UP
HCT VFR BLD CALC: 40.2 % — SIGNIFICANT CHANGE UP (ref 34.5–45)
HGB BLD-MCNC: 13.3 G/DL — SIGNIFICANT CHANGE UP (ref 11.5–15.5)
IMM GRANULOCYTES NFR BLD AUTO: 0.1 % — SIGNIFICANT CHANGE UP (ref 0–1.5)
KETONES UR-MCNC: NEGATIVE — SIGNIFICANT CHANGE UP
LEUKOCYTE ESTERASE UR-ACNC: ABNORMAL
LYMPHOCYTES # BLD AUTO: 2.02 K/UL — SIGNIFICANT CHANGE UP (ref 1–3.3)
LYMPHOCYTES # BLD AUTO: 24.8 % — SIGNIFICANT CHANGE UP (ref 13–44)
MCHC RBC-ENTMCNC: 28.2 PG — SIGNIFICANT CHANGE UP (ref 27–34)
MCHC RBC-ENTMCNC: 33.1 GM/DL — SIGNIFICANT CHANGE UP (ref 32–36)
MCV RBC AUTO: 85.4 FL — SIGNIFICANT CHANGE UP (ref 80–100)
MONOCYTES # BLD AUTO: 0.51 K/UL — SIGNIFICANT CHANGE UP (ref 0–0.9)
MONOCYTES NFR BLD AUTO: 6.3 % — SIGNIFICANT CHANGE UP (ref 2–14)
NEUTROPHILS # BLD AUTO: 5.52 K/UL — SIGNIFICANT CHANGE UP (ref 1.8–7.4)
NEUTROPHILS NFR BLD AUTO: 68 % — SIGNIFICANT CHANGE UP (ref 43–77)
NITRITE UR-MCNC: NEGATIVE — SIGNIFICANT CHANGE UP
PH UR: 6 — SIGNIFICANT CHANGE UP (ref 5–8)
PLATELET # BLD AUTO: 327 K/UL — SIGNIFICANT CHANGE UP (ref 150–400)
POTASSIUM SERPL-MCNC: 3.5 MMOL/L — SIGNIFICANT CHANGE UP (ref 3.5–5.3)
POTASSIUM SERPL-SCNC: 3.5 MMOL/L — SIGNIFICANT CHANGE UP (ref 3.5–5.3)
PROT SERPL-MCNC: 7.7 GM/DL — SIGNIFICANT CHANGE UP (ref 6–8.3)
PROT UR-MCNC: NEGATIVE — SIGNIFICANT CHANGE UP
RBC # BLD: 4.71 M/UL — SIGNIFICANT CHANGE UP (ref 3.8–5.2)
RBC # FLD: 14.4 % — SIGNIFICANT CHANGE UP (ref 10.3–14.5)
SODIUM SERPL-SCNC: 143 MMOL/L — SIGNIFICANT CHANGE UP (ref 135–145)
SP GR SPEC: 1.02 — SIGNIFICANT CHANGE UP (ref 1.01–1.02)
UROBILINOGEN FLD QL: NEGATIVE — SIGNIFICANT CHANGE UP
WBC # BLD: 8.13 K/UL — SIGNIFICANT CHANGE UP (ref 3.8–10.5)
WBC # FLD AUTO: 8.13 K/UL — SIGNIFICANT CHANGE UP (ref 3.8–10.5)

## 2022-07-03 PROCEDURE — 74176 CT ABD & PELVIS W/O CONTRAST: CPT | Mod: MA

## 2022-07-03 PROCEDURE — 74176 CT ABD & PELVIS W/O CONTRAST: CPT | Mod: 26,MA

## 2022-07-03 PROCEDURE — 36415 COLL VENOUS BLD VENIPUNCTURE: CPT

## 2022-07-03 PROCEDURE — 87086 URINE CULTURE/COLONY COUNT: CPT

## 2022-07-03 PROCEDURE — 96361 HYDRATE IV INFUSION ADD-ON: CPT

## 2022-07-03 PROCEDURE — 80053 COMPREHEN METABOLIC PANEL: CPT

## 2022-07-03 PROCEDURE — 99284 EMERGENCY DEPT VISIT MOD MDM: CPT | Mod: 25

## 2022-07-03 PROCEDURE — 99285 EMERGENCY DEPT VISIT HI MDM: CPT

## 2022-07-03 PROCEDURE — 96360 HYDRATION IV INFUSION INIT: CPT

## 2022-07-03 PROCEDURE — 81001 URINALYSIS AUTO W/SCOPE: CPT

## 2022-07-03 PROCEDURE — 85025 COMPLETE CBC W/AUTO DIFF WBC: CPT

## 2022-07-03 RX ORDER — IBUPROFEN 200 MG
600 TABLET ORAL ONCE
Refills: 0 | Status: COMPLETED | OUTPATIENT
Start: 2022-07-03 | End: 2022-07-03

## 2022-07-03 RX ORDER — SODIUM CHLORIDE 9 MG/ML
500 INJECTION INTRAMUSCULAR; INTRAVENOUS; SUBCUTANEOUS ONCE
Refills: 0 | Status: DISCONTINUED | OUTPATIENT
Start: 2022-07-03 | End: 2022-07-03

## 2022-07-03 RX ORDER — SODIUM CHLORIDE 9 MG/ML
1000 INJECTION INTRAMUSCULAR; INTRAVENOUS; SUBCUTANEOUS ONCE
Refills: 0 | Status: COMPLETED | OUTPATIENT
Start: 2022-07-03 | End: 2022-07-03

## 2022-07-03 RX ADMIN — Medication 600 MILLIGRAM(S): at 19:19

## 2022-07-03 RX ADMIN — SODIUM CHLORIDE 1000 MILLILITER(S): 9 INJECTION INTRAMUSCULAR; INTRAVENOUS; SUBCUTANEOUS at 17:50

## 2022-07-03 RX ADMIN — SODIUM CHLORIDE 1000 MILLILITER(S): 9 INJECTION INTRAMUSCULAR; INTRAVENOUS; SUBCUTANEOUS at 18:50

## 2022-07-03 NOTE — ED STATDOCS - CLINICAL SUMMARY MEDICAL DECISION MAKING FREE TEXT BOX
52 yo female w/ no pertinent presents with left flank and LLQ pain. Pt states pain is different from diverticulitis and UTI. Will get labs, blood work, reassess.

## 2022-07-03 NOTE — ED STATDOCS - PATIENT PORTAL LINK FT
You can access the FollowMyHealth Patient Portal offered by [facility] by registering at the following website: [email or web address]. By joining [employer]’s FollowMyHealth portal, you will also be able to view your health information using other applications (apps) compatible with our system.

## 2022-07-03 NOTE — ED STATDOCS - ATTENDING APP SHARED VISIT CONTRIBUTION OF CARE
I, Lizz Nieto MD, performed the initial face to face bedside interview with this patient regarding history of present illness, review of symptoms and relevant past medical, social and family history.  I completed an independent physical examination.  I was the initial provider who evaluated this patient. I have signed out the follow up of any pending tests (i.e. labs, radiological studies) to the ACP.  I have communicated the patient’s plan of care and disposition with the ACP.  The history, relevant review of systems, past medical and surgical history, medical decision making, and physical examination was documented by the scribe in my presence and I attest to the accuracy of the documentation.

## 2022-07-03 NOTE — ED STATDOCS - PHYSICAL EXAMINATION
Constitutional: NAD AAOx3  Eyes: PERRLA EOMI  Head: Normocephalic atraumatic  Mouth: MMM  Cardiac: regular rate   Resp: Lungs CTAB  GI: Abd s/ LLQ and left flank ttp/nd, no rebound or guarding.  Neuro: awake, alert, moving all extremities, cranial nerves 2-12 intact, sensation intact, no dysmetria.  Skin: No rashes

## 2022-07-03 NOTE — ED STATDOCS - NSFOLLOWUPCLINICS_GEN_ALL_ED_FT
[facility]  Family Medicine  [address] [place] [address]  Phone: [phone]  Fax:   Established Patient  Follow Up Time: 1-3 Days

## 2022-07-03 NOTE — ED STATDOCS - NSFOLLOWUPINSTRUCTIONS_ED_ALL_ED_FT
SIGUE A TU MÉDICO EN 1-2 DÍAS. REGRESE A LA ER PARA CUALQUIER SÍNTOMA PENDIENTE O NUEVAS PREOCUPACIONES.       Dolor abdominal en adultos  Abdominal Pain, Adult  El dolor abdominal puede tener muchas causas. A menudo, no es grave y mejora sin tratamiento o con tratamiento en la casa. Sin embargo, a veces el dolor abdominal es intenso. El médico revisará marques antecedentes médicos y le hará un examen físico para tratar de determinar la causa del dolor abdominal.  Siga estas instrucciones en ellis casa:  [place] los medicamentos de venta steph y los recetados solamente fiorella se lo haya indicado el médico. No tome un laxante a menos que se lo haya indicado el médico.[place] suficiente líquido para mantener la orina sanjay o de color amarillo pálido.Controle ellis afección para latoya si hay cambios.Concurra a todas las visitas de control fiorella se lo haya indicado el médico. [place] es importante.Comuníquese con un médico si:  El dolor abdominal cambia o empeora.No tiene apetito o baja de peso sin proponérselo.Está estreñido o tiene diarrea telma más de 2 o 3 días.Tiene dolor cuando orina o defeca.El dolor abdominal lo despierta de noche.El dolor empeora con las comidas, después de comer o con determinados alimentos.Tiene vómitos y no puede retener nada.Tiene fiebre.Solicite ayuda de inmediato si:  El dolor no desaparece tan pronto fiorella el médico le dijo que era esperable.No puede detener los vómitos.El dolor se siente solo en zonas del abdomen, fiorella el lado derecho o la parte inferior izquierda del abdomen.Las heces son sanguinolentas o de color farzad, o de aspecto alquitranado.Tiene dolor intenso, cólicos, o meteorismo en el abdomen.Tiene signos de deshidratación, por ejemplo:  Orina oscura, muy escasa o falta de orina.Labios agrietados.[place].Ojos hundidos.Somnolencia.Debilidad.Esta información no tiene fiorella fin reemplazar el consejo del médico. Asegúrese de hacerle al médico cualquier pregunta que tenga.

## 2022-07-03 NOTE — ED STATDOCS - PROGRESS NOTE DETAILS
signed Justina Espino PA-C Pt seen and evaluated in intake by Dr Nieto. Pt declines  services.   51F c/o left sided abd pain radiating to the back x 2 days. No fever/N/V/D/urinary symptoms. Denies PMH, not on meds. [facility]. Pt alert, NAD, +left sided mid abd TTP. Plan labs, UA, CT. Pt agrees with plan of  care. Pt declines analgesia at this time. signed Justina Espino PA-C   No significant findings on labwork or imaging. Possibly MSK pain? Will give motrin and recommend f/u [facility]. return precautions given. Pt feeling well, pt and family agree with DC and plan of care.

## 2022-07-03 NOTE — ED STATDOCS - NS ED ROS FT
Constitutional: No fever or chills  Eyes: No visual changes  HEENT: No throat pain  CV: No chest pain  Resp: No SOB no cough  GI: + abd pain, no nausea or vomiting  : No dysuria  MSK: +back pain   Skin: No rash  Neuro: No headache

## 2022-07-03 NOTE — ED STATDOCS - OBJECTIVE STATEMENT
52 yo female w/ no pertinent PMHx presents to the ED c.o lower abd pain that radiates to the back. Pt states that the pain is constant. Pt denies any nausea, vomiting, or any other symptoms. Pt denies any surgery on abd. No other complaints at this time.

## 2022-07-05 LAB
CULTURE RESULTS: SIGNIFICANT CHANGE UP
SPECIMEN SOURCE: SIGNIFICANT CHANGE UP

## 2022-08-23 ENCOUNTER — APPOINTMENT (OUTPATIENT)
Dept: MAMMOGRAPHY | Facility: CLINIC | Age: 52
End: 2022-08-23

## 2022-11-12 NOTE — ED PROCEDURE NOTE - CPROC ED LACER REPAIR DETAIL1
no exposed bone tendon or joint/The wound was explored to base in bloodless field./No foreign body negative...

## 2023-08-01 ENCOUNTER — EMERGENCY (EMERGENCY)
Facility: HOSPITAL | Age: 53
LOS: 0 days | Discharge: HOME | End: 2023-08-01
Attending: EMERGENCY MEDICINE
Payer: MEDICAID

## 2023-08-01 VITALS
HEART RATE: 83 BPM | RESPIRATION RATE: 16 BRPM | DIASTOLIC BLOOD PRESSURE: 87 MMHG | OXYGEN SATURATION: 98 % | TEMPERATURE: 98 F | HEIGHT: 65 IN | WEIGHT: 164.91 LBS | SYSTOLIC BLOOD PRESSURE: 138 MMHG

## 2023-08-01 VITALS
HEART RATE: 80 BPM | OXYGEN SATURATION: 100 % | DIASTOLIC BLOOD PRESSURE: 84 MMHG | SYSTOLIC BLOOD PRESSURE: 133 MMHG | RESPIRATION RATE: 18 BRPM | TEMPERATURE: 98 F

## 2023-08-01 DIAGNOSIS — M75.32 CALCIFIC TENDINITIS OF LEFT SHOULDER: ICD-10-CM

## 2023-08-01 DIAGNOSIS — M25.512 PAIN IN LEFT SHOULDER: ICD-10-CM

## 2023-08-01 PROCEDURE — 99283 EMERGENCY DEPT VISIT LOW MDM: CPT | Mod: 25

## 2023-08-01 PROCEDURE — 73030 X-RAY EXAM OF SHOULDER: CPT | Mod: LT

## 2023-08-01 PROCEDURE — 73030 X-RAY EXAM OF SHOULDER: CPT | Mod: 26,LT

## 2023-08-01 PROCEDURE — 99284 EMERGENCY DEPT VISIT MOD MDM: CPT

## 2023-08-01 RX ORDER — ACETAMINOPHEN 500 MG
650 TABLET ORAL ONCE
Refills: 0 | Status: COMPLETED | OUTPATIENT
Start: 2023-08-01 | End: 2023-08-01

## 2023-08-01 RX ORDER — IBUPROFEN 200 MG
1 TABLET ORAL
Qty: 30 | Refills: 0
Start: 2023-08-01

## 2023-08-01 RX ORDER — DEXAMETHASONE 0.5 MG/5ML
10 ELIXIR ORAL ONCE
Refills: 0 | Status: COMPLETED | OUTPATIENT
Start: 2023-08-01 | End: 2023-08-01

## 2023-08-01 RX ADMIN — Medication 10 MILLIGRAM(S): at 11:44

## 2023-08-01 RX ADMIN — Medication 650 MILLIGRAM(S): at 11:35

## 2023-08-01 NOTE — ED ADULT TRIAGE NOTE - CHIEF COMPLAINT QUOTE
Pt presents to the ED c/o left shoulder pain x2 days. Denies injury. PMH of tendinitis. Pt has been taking Motrin without relief.

## 2023-08-01 NOTE — ED ADULT NURSE NOTE - NSFALLUNIVINTERV_ED_ALL_ED
Bed/Stretcher in lowest position, wheels locked, appropriate side rails in place/Call bell, personal items and telephone in reach/Instruct patient to call for assistance before getting out of bed/chair/stretcher/Non-slip footwear applied when patient is off stretcher/[place] to call system/Physically safe environment - no spills, clutter or unnecessary equipment/Purposeful proactive rounding/Room/bathroom lighting operational, light cord in reach

## 2023-08-01 NOTE — ED STATDOCS - PROGRESS NOTE DETAILS
51 yo female with a PMH of tendinitis, s/p carpal tunnel surgery to the L forearm presents with L shoulder pain since yesterday. Pain worse with movement. Tried to take ibuprofen without relief. Nigel trauma or injuries but states she sleeps on that side. Denies numbness or tingling to the affected shoulder.   Decreased ROM of the L shoulder secondary to pain. +ttp to lateral upper shoulder region.   Will check xray, meds. Possible calcific tendinitis. Reeval. -Thai Becerril PA-C Xr with likely calcific tendinitis. Will give meds. Advised to continue moving the shoulder and not place into a sling to prevent the shoulder from freezing. Will prescribe prednisone and advised to f/u with her orthopedist for further evaluation. -Thai Becerril PA-C

## 2023-08-01 NOTE — ED STATDOCS - NSFOLLOWUPINSTRUCTIONS_ED_ALL_ED_FT
Calcific Tendinitis  Body outline with a close-up showing a shoulder joint with crystals of calcium in a tendon.  Calcific tendinitis occurs when crystals of calcium are deposited in a tendon. Tendons are tough, cord-like tissues that connect muscle to bone. Tendons are an important part of joints. They make joints move, and they absorb some of the stress that a joint receives during use.    When calcium is deposited in the tendon, the tendon becomes stiff and painful and may become swollen. Calcific tendinitis often occurs in a tendon in the rotator cuff. The rotator cuff is a group of muscles and tendons in the shoulder joint.    What are the causes?  The cause of calcific tendinitis is not known. It may be associated with:  Overusing a tendon, such as from doing the same movements over and over again (repetitive motion).  Too much stress on the tendon.  Age-related wear and tear.  Repetitive, mild injuries.  What increases the risk?  This condition is more likely to develop in:  People who do activities that involve repetitive motions.  Older people.  Women.  People who have diabetes or thyroid problems (hypothyroidism).  What are the signs or symptoms?  Symptoms of this condition include:  Pain. This condition may or may not be painful. If there is pain, it may occur when moving the joint.  Tenderness when pressure is applied to the tendon.  A snapping or popping sound when the joint moves.  Decreased motion of the joint.  Difficulty sleeping due to pain in the joint.  How is this diagnosed?  This condition is diagnosed with a physical exam. You may also have tests, such as:  X-rays.  MRI.  CT scan.  How is this treated?  This condition generally gets better on its own. Treatment may also include:  Resting, icing, applying pressure (compression), and raising (elevating) the area above the level of your heart. This is known as RICE therapy.  Applying heat to the affected area.  Medicines to help reduce inflammation or pain.  Physical therapy to improve movement and strength of the tendon.  Following a specific exercise program to keep the joint working properly.  In severe or persistent cases, treatment may include:  Injecting steroids or pain-relieving medicines into or around the joint.  Manipulating the joint after you are given medicine to numb the area (local anesthetic).  Inflating the joint with sterile fluid to increase the flexibility of the tendons.  Having shock wave therapy, which involves focusing sound waves on the joint.  If other treatments do not work, surgery may be needed to clean out the calcium deposits and possibly repair the tendon. Most people do not need surgery.    Follow these instructions at home:  Managing pain, stiffness, and swelling    Bag of ice on a towel on the skin.  A heating pad being used on the affected area.  If directed, put ice on the affected area. To do this:  Put ice in a plastic bag.  Place a towel between your skin and the bag.  Leave the ice on for 20 minutes, 2–3 times a day.  Remove the ice if your skin turns bright red. This is very important. If you cannot feel pain, heat, or cold, you have a greater risk of damage to the area.  If directed, apply heat to the affected area before you exercise or as often as told by your health care provider. Use the heat source that your health care provider recommends, such as a moist heat pack or a heating pad.  Place a towel between your skin and the heat source.  Leave the heat on for 20–30 minutes.  Remove the heat if your skin turns bright red. This is especially important if you are unable to feel pain, heat, or cold. You have a greater risk of getting burned.  Move the fingers or toes of the affected limb often. This can help to reduce stiffness and swelling.  Raise (elevate) the injured area above the level of your heart while you are sitting or lying down.  Medicines    Take over-the-counter and prescription medicines only as told by your health care provider.  Ask your health care provider if the medicine prescribed to you requires you to avoid driving or using machinery.  General instructions    Do not use any products that contain nicotine or tobacco. These products include cigarettes, chewing tobacco, and vaping devices, such as e-cigarettes. These can delay healing. If you need help quitting, ask your health care provider.  Follow recommendations from your health care provider about activity and exercise. Ask your health care provider what activities are safe for you.  Avoid using the affected area while you are experiencing symptoms of tendinitis.  Wear an elastic bandage or compression wrap as told by your health care provider.  Keep all follow-up visits. This is important.  Contact a health care provider if:  You have pain or numbness that gets worse.  You develop new weakness.  You have increased joint stiffness or a sensation of looseness in the joint.  You notice increasing redness, swelling, or warmth around the joint area.  You have a fever and your symptoms suddenly get worse.  Summary  Calcific tendinitis occurs when crystals of calcium are deposited in a tendon.  Calcific tendinitis often occurs in a tendon in the rotator cuff, which is a group of muscles and tendons in the shoulder joint.  This condition may or may not be painful.  This condition generally gets better on its own.  Treatment may include rest, ice, medicines, and physical therapy. In rare cases, surgery may be needed.

## 2023-08-01 NOTE — ED STATDOCS - OBJECTIVE STATEMENT
51 y/o female with PMHx of tendonitis presents to the ED c/o left shoulder pain radiating down her left arm, with limited ROM secondary to pain. Patient with history of tendonitis, given hot pads which patient has been using without relief. Took Ibuprofen PTA without relief.

## 2023-08-01 NOTE — ED STATDOCS - PHYSICAL EXAMINATION
Gen: Awake, Alert, WD, WN, NAD  Head:  NC/AT  Eyes:  PERRL, EOMI, Conjunctiva pink, lids normal, no scleral icterus  ENT: OP clear, no exudates, no erythema, uvula midline, TMs clear bilaterally, moist mucus membranes  Neck: supple, nontender, no meningismus, no JVD, trachea midline  Cardiac/CV:  S1 S2, RRR, no M/G/R  Chest: nontender, no crepitus  Respiratory/Pulm:  CTAB, good air movement, normal resp effort, no wheezes/stridor/retractions/rales/rhonchi  Gastrointestinal/Abdomen:  Soft, nontender, nondistended, +BS, no rebound/guarding  Pelvis: stable, nontender, Hips: FROM, nontender  Back:  no CVAT, no MLT  Ext:  +Tenderness to lateral aspect of shoulder with limited passive ROM of the shoulder, but able to actively range.  Skin: intact, no rash, no vesicles, no petechiae, no ecchymosis  Neuro:  AAOx3, sensation intact, motor 5/5 x 4 extremities, normal gait, speech clear

## 2023-08-01 NOTE — ED STATDOCS - NS ED ATTENDING STATEMENT MOD
This was a shared visit with the NEHA. I reviewed and verified the documentation and independently performed the documented:
(4) walks frequently

## 2023-08-01 NOTE — ED STATDOCS - PATIENT PORTAL LINK FT
You can access the FollowMyHealth Patient Portal offered by [facility] by registering at the following website: [email or web address]. By joining [employer]’s FollowMyHealth portal, you will also be able to view your health information using other applications (apps) compatible with our system.

## 2023-08-01 NOTE — ED STATDOCS - CLINICAL SUMMARY MEDICAL DECISION MAKING FREE TEXT BOX
Patient with history of tendonitis with left shoulder pain consistent with likely tendonitis. No signs of infection, NVI. Will obtain screening XR and medication including steroids.

## 2025-06-09 NOTE — ED ADULT NURSE NOTE - EXTENSIONS OF SELF_ADULT
Kidney function testing abnormal (reduced glomerular filtration rate--GFR).    Will discuss results at office visit tomorrow.  Reduced kidney function can be from using nonsteroidals ibuprofen or Aleve try to avoid.  Increase water intake to 64 ounces.  In 2 weeks repeat kidney function testing .  Tomorrow we will collect a urine microalbumin creatinine ratio    Elevated cholesterol and LDL,  Decrease saturated fats and avoid processed foods.  Avoid fatty foods/fried foods.  If eating meat try lean cuts of meat such as chicken and fish (salmon and tuna).  If able, add maisha seeds, almonds, walnuts, pumpkin seeds, sunflower seeds, beans, fruits, vegetables, avocado steel cut oats, lentils, quinoa, rye, wild rice, whole grain cereals to diet.   Cook with avocado oil, grape seed oil or olive oil. Review the Mediterranean diet on line.  Get heart scan done.  Schedule for the individual heart scan $49 call [phone] to schedule can be done at [place], [place], [place], [place] and [place]    Normal white and red blood cell counts.  Normal liver function testing.  Normal blood sugar testing.  Normal thyroid testing    Sincerely,   Alona Rincon PA-C     None